# Patient Record
Sex: FEMALE | Race: WHITE | NOT HISPANIC OR LATINO | Employment: OTHER | URBAN - METROPOLITAN AREA
[De-identification: names, ages, dates, MRNs, and addresses within clinical notes are randomized per-mention and may not be internally consistent; named-entity substitution may affect disease eponyms.]

---

## 2017-05-23 ENCOUNTER — PREPPED CHART (OUTPATIENT)
Dept: URBAN - METROPOLITAN AREA CLINIC 39 | Facility: CLINIC | Age: 82
End: 2017-05-23

## 2017-10-25 NOTE — PATIENT DISCUSSION
MILD REDUCTION IN CENTRAL SENSITIVITY - ? 2ND AMD - OTHER POSSIBILITY IS OCCIPITAL CVA - DENIES HA, WEAKNESS PARASTHESIA - RECOM F/U INTERNIST.

## 2018-04-17 ENCOUNTER — ESTABLISHED COMPREHENSIVE EXAM (OUTPATIENT)
Dept: URBAN - METROPOLITAN AREA CLINIC 39 | Facility: CLINIC | Age: 83
End: 2018-04-17

## 2018-04-17 DIAGNOSIS — E11.9: ICD-10-CM

## 2018-04-17 DIAGNOSIS — H43.813: ICD-10-CM

## 2018-04-17 DIAGNOSIS — H35.3132: ICD-10-CM

## 2018-04-17 PROCEDURE — 3072F LOW RISK FOR RETINOPATHY: CPT

## 2018-04-17 PROCEDURE — 2019F DILATED MACUL EXAM DONE: CPT

## 2018-04-17 PROCEDURE — 9222550 BILAT EXTENDED OPHTHALMOSCOPY, FIRST

## 2018-04-17 PROCEDURE — 1036F TOBACCO NON-USER: CPT

## 2018-04-17 PROCEDURE — 92014 COMPRE OPH EXAM EST PT 1/>: CPT

## 2018-04-17 PROCEDURE — G8427 DOCREV CUR MEDS BY ELIG CLIN: HCPCS

## 2018-04-17 PROCEDURE — 92134 CPTRZ OPH DX IMG PST SGM RTA: CPT

## 2018-04-17 PROCEDURE — 2022F DILAT RTA XM EVC RTNOPTHY: CPT

## 2018-04-17 PROCEDURE — 4177F TALK PT/CRGVR RE AREDS PREV: CPT

## 2018-04-17 PROCEDURE — G8756 NO BP MEASURE DOC: HCPCS

## 2018-04-17 ASSESSMENT — VISUAL ACUITY
OS_CC: 20/25+2
OD_CC: 20/25+1
OS_CC: J1
OD_CC: J2

## 2018-04-17 ASSESSMENT — TONOMETRY
OS_IOP_MMHG: 14
OD_IOP_MMHG: 14